# Patient Record
Sex: MALE | Race: OTHER | Employment: FULL TIME | URBAN - METROPOLITAN AREA
[De-identification: names, ages, dates, MRNs, and addresses within clinical notes are randomized per-mention and may not be internally consistent; named-entity substitution may affect disease eponyms.]

---

## 2017-01-10 ENCOUNTER — OFFICE VISIT (OUTPATIENT)
Dept: INTERNAL MEDICINE CLINIC | Facility: CLINIC | Age: 37
End: 2017-01-10

## 2017-01-10 VITALS
SYSTOLIC BLOOD PRESSURE: 134 MMHG | OXYGEN SATURATION: 96 % | HEIGHT: 73 IN | BODY MASS INDEX: 26.37 KG/M2 | TEMPERATURE: 99 F | WEIGHT: 199 LBS | DIASTOLIC BLOOD PRESSURE: 75 MMHG | HEART RATE: 108 BPM

## 2017-01-10 DIAGNOSIS — M54.42 ACUTE BILATERAL LOW BACK PAIN WITH LEFT-SIDED SCIATICA: Primary | ICD-10-CM

## 2017-01-10 PROCEDURE — 99213 OFFICE O/P EST LOW 20 MIN: CPT | Performed by: INTERNAL MEDICINE

## 2017-01-10 PROCEDURE — 99212 OFFICE O/P EST SF 10 MIN: CPT | Performed by: INTERNAL MEDICINE

## 2017-01-10 RX ORDER — CYCLOBENZAPRINE HCL 10 MG
10 TABLET ORAL 3 TIMES DAILY PRN
Qty: 60 TABLET | Refills: 0 | Status: SHIPPED | OUTPATIENT
Start: 2017-01-10 | End: 2017-01-30

## 2017-01-10 RX ORDER — HYDROCODONE BITARTRATE AND ACETAMINOPHEN 5; 325 MG/1; MG/1
1 TABLET ORAL EVERY 6 HOURS PRN
Qty: 40 TABLET | Refills: 0 | Status: SHIPPED | OUTPATIENT
Start: 2017-01-10 | End: 2017-02-24 | Stop reason: ALTCHOICE

## 2017-01-10 RX ORDER — METHYLPREDNISOLONE 4 MG/1
TABLET ORAL
Qty: 1 KIT | Refills: 0 | Status: SHIPPED | OUTPATIENT
Start: 2017-01-10 | End: 2017-02-24 | Stop reason: ALTCHOICE

## 2017-01-10 NOTE — PROGRESS NOTES
HPI:    Patient ID: Dheeraj Christensen is a 31year old male. HPI  Pt comes in with complaint of low back pain for few days, the pain kind of started right away and now its so bad having hard time moving and sitting straight.  Pain starts in te middle an exhibits tenderness. He exhibits no edema. Low back with shooting towards the left buttock and leg   Neurological: He is alert and oriented to person, place, and time. He has normal reflexes. Skin: Skin is warm and dry.               ASSESSMENT/PLAN:

## 2017-01-10 NOTE — PATIENT INSTRUCTIONS
ASSESSMENT/PLAN:   Acute bilateral low back pain with left-sided sciatica  (primary encounter diagnosis)- this most likely is a inflamed pretty, or bulging/herniated disc, treatment is the same.  Will give you steroids and muscle relaxant and pain medica

## 2017-01-17 ENCOUNTER — CHARTING TRANS (OUTPATIENT)
Dept: OTHER | Age: 37
End: 2017-01-17

## 2017-01-17 ENCOUNTER — TELEPHONE (OUTPATIENT)
Dept: INTERNAL MEDICINE CLINIC | Facility: CLINIC | Age: 37
End: 2017-01-17

## 2017-01-17 DIAGNOSIS — M54.42 ACUTE LEFT-SIDED LOW BACK PAIN WITH LEFT-SIDED SCIATICA: Primary | ICD-10-CM

## 2017-01-17 NOTE — TELEPHONE ENCOUNTER
Patient is looking for order for physical therapy  Please let him know when ready and mail to patient home

## 2017-02-01 ENCOUNTER — OFFICE VISIT (OUTPATIENT)
Dept: PHYSICAL THERAPY | Facility: HOSPITAL | Age: 37
End: 2017-02-01
Attending: INTERNAL MEDICINE
Payer: COMMERCIAL

## 2017-02-01 DIAGNOSIS — M54.42 ACUTE LEFT-SIDED LOW BACK PAIN WITH LEFT-SIDED SCIATICA: Primary | ICD-10-CM

## 2017-02-01 PROCEDURE — 97110 THERAPEUTIC EXERCISES: CPT

## 2017-02-01 PROCEDURE — 97161 PT EVAL LOW COMPLEX 20 MIN: CPT

## 2017-02-01 NOTE — PROGRESS NOTES
LUMBAR SPINE EVALUATION:   Referring Physician: Dr. Severiano Jenkins  Diagnosis: acute left sided LBP with left sciatica Date of Service: 2/1/2017   Date of Onset: 1/18/17    PATIENT SUMMARY   Mitra Farooq is a 31year old y/o male who presents to therapy to Flexion 5/5 5/5    Ankle DF (L4)  5/5 5/5                Hip Abduction 4+/5 4+/5    Hip Extension 4+/5 pain on right 4+/5      Flexibility:      R L   Hamstrings moderately restricted moderately restricted   Hip rotators Min restricted Min restricted   stefano care.    X___________________________________________________ Date____________________    Certification From: 8/1/7525  To:5/2/2017

## 2017-02-04 ENCOUNTER — OFFICE VISIT (OUTPATIENT)
Dept: PHYSICAL THERAPY | Facility: HOSPITAL | Age: 37
End: 2017-02-04
Attending: INTERNAL MEDICINE
Payer: COMMERCIAL

## 2017-02-04 PROCEDURE — 97110 THERAPEUTIC EXERCISES: CPT

## 2017-02-04 NOTE — PROGRESS NOTES
Diagnosis: acute left sided LBP with sciatica  Authorized # of Visits:  2         Next MD visit: none scheduled  Fall Risk: standard         Precautions: n/a           Medication Changes since last visit?: No  Subjective: Pt reports doing exercises 2-3/10

## 2017-02-08 ENCOUNTER — OFFICE VISIT (OUTPATIENT)
Dept: PHYSICAL THERAPY | Facility: HOSPITAL | Age: 37
End: 2017-02-08
Attending: INTERNAL MEDICINE
Payer: COMMERCIAL

## 2017-02-08 PROCEDURE — 97110 THERAPEUTIC EXERCISES: CPT

## 2017-02-08 NOTE — PROGRESS NOTES
Diagnosis: acute left sided LBP with sciatica  Authorized # of Visits:  3         Next MD visit: none scheduled  Fall Risk: standard         Precautions: n/a           Medication Changes since last visit?: No  Subjective: Pt reports doing exercises twice a

## 2017-02-11 ENCOUNTER — APPOINTMENT (OUTPATIENT)
Dept: PHYSICAL THERAPY | Facility: HOSPITAL | Age: 37
End: 2017-02-11
Attending: INTERNAL MEDICINE
Payer: COMMERCIAL

## 2017-02-15 ENCOUNTER — APPOINTMENT (OUTPATIENT)
Dept: PHYSICAL THERAPY | Facility: HOSPITAL | Age: 37
End: 2017-02-15
Attending: INTERNAL MEDICINE
Payer: COMMERCIAL

## 2017-02-18 ENCOUNTER — APPOINTMENT (OUTPATIENT)
Dept: PHYSICAL THERAPY | Facility: HOSPITAL | Age: 37
End: 2017-02-18
Attending: INTERNAL MEDICINE
Payer: COMMERCIAL

## 2017-02-22 ENCOUNTER — APPOINTMENT (OUTPATIENT)
Dept: PHYSICAL THERAPY | Facility: HOSPITAL | Age: 37
End: 2017-02-22
Attending: INTERNAL MEDICINE
Payer: COMMERCIAL

## 2017-02-24 ENCOUNTER — OFFICE VISIT (OUTPATIENT)
Dept: INTERNAL MEDICINE CLINIC | Facility: CLINIC | Age: 37
End: 2017-02-24

## 2017-02-24 VITALS
TEMPERATURE: 99 F | DIASTOLIC BLOOD PRESSURE: 83 MMHG | HEART RATE: 66 BPM | SYSTOLIC BLOOD PRESSURE: 127 MMHG | WEIGHT: 197.63 LBS | BODY MASS INDEX: 26 KG/M2

## 2017-02-24 DIAGNOSIS — M70.62 TROCHANTERIC BURSITIS OF LEFT HIP: Primary | ICD-10-CM

## 2017-02-24 PROCEDURE — 99212 OFFICE O/P EST SF 10 MIN: CPT | Performed by: INTERNAL MEDICINE

## 2017-02-24 PROCEDURE — 99214 OFFICE O/P EST MOD 30 MIN: CPT | Performed by: INTERNAL MEDICINE

## 2017-02-24 NOTE — PROGRESS NOTES
HPI:    Patient ID: Love Dotson is a 31year old male.     HPI     Hip pain   In early January 2017, patient went to Dr. Jj Hernandez for low back pain and was diagnosed with muscle spasms , possible disc problem - was prescribed a steroid medication Head: Normocephalic and atraumatic. Eyes: Conjunctivae are normal.   Neck: Normal range of motion. Pulmonary/Chest: Effort normal. No respiratory distress. Musculoskeletal: Normal range of motion.    Left hip no pain on extension/flexion or rotation documentation has been prepared under the direction and in the presence of Lord Bridgett MD.   Electronically Signed: Gabino Cason, 2/24/2017, 12:30 PM.    I, Lord Bridgett MD,  personally performed the services described in this documentation.  All medic

## 2017-03-01 ENCOUNTER — APPOINTMENT (OUTPATIENT)
Dept: PHYSICAL THERAPY | Facility: HOSPITAL | Age: 37
End: 2017-03-01
Attending: INTERNAL MEDICINE
Payer: COMMERCIAL

## 2017-03-23 ENCOUNTER — LAB ENCOUNTER (OUTPATIENT)
Dept: LAB | Age: 37
End: 2017-03-23
Attending: INTERNAL MEDICINE
Payer: COMMERCIAL

## 2017-03-23 ENCOUNTER — OFFICE VISIT (OUTPATIENT)
Dept: INTERNAL MEDICINE CLINIC | Facility: CLINIC | Age: 37
End: 2017-03-23

## 2017-03-23 VITALS
RESPIRATION RATE: 18 BRPM | WEIGHT: 192 LBS | TEMPERATURE: 99 F | BODY MASS INDEX: 25.45 KG/M2 | SYSTOLIC BLOOD PRESSURE: 130 MMHG | HEIGHT: 73 IN | DIASTOLIC BLOOD PRESSURE: 78 MMHG | HEART RATE: 62 BPM

## 2017-03-23 DIAGNOSIS — Z00.00 ROUTINE PHYSICAL EXAMINATION: Primary | ICD-10-CM

## 2017-03-23 DIAGNOSIS — Z00.00 ROUTINE PHYSICAL EXAMINATION: ICD-10-CM

## 2017-03-23 DIAGNOSIS — M25.552 LEFT HIP PAIN: ICD-10-CM

## 2017-03-23 LAB
ALBUMIN SERPL BCP-MCNC: 4.5 G/DL (ref 3.5–4.8)
ALBUMIN/GLOB SERPL: 1.6 {RATIO} (ref 1–2)
ALP SERPL-CCNC: 71 U/L (ref 32–100)
ALT SERPL-CCNC: 20 U/L (ref 17–63)
ANION GAP SERPL CALC-SCNC: 6 MMOL/L (ref 0–18)
AST SERPL-CCNC: 21 U/L (ref 15–41)
BASOPHILS # BLD: 0 K/UL (ref 0–0.2)
BASOPHILS NFR BLD: 1 %
BILIRUB SERPL-MCNC: 0.8 MG/DL (ref 0.3–1.2)
BUN SERPL-MCNC: 14 MG/DL (ref 8–20)
BUN/CREAT SERPL: 16.7 (ref 10–20)
CALCIUM SERPL-MCNC: 9.7 MG/DL (ref 8.5–10.5)
CHLORIDE SERPL-SCNC: 105 MMOL/L (ref 95–110)
CHOLEST SERPL-MCNC: 200 MG/DL (ref 110–200)
CO2 SERPL-SCNC: 29 MMOL/L (ref 22–32)
CREAT SERPL-MCNC: 0.84 MG/DL (ref 0.5–1.5)
EOSINOPHIL # BLD: 0.1 K/UL (ref 0–0.7)
EOSINOPHIL NFR BLD: 2 %
ERYTHROCYTE [DISTWIDTH] IN BLOOD BY AUTOMATED COUNT: 12.8 % (ref 11–15)
GLOBULIN PLAS-MCNC: 2.8 G/DL (ref 2.5–3.7)
GLUCOSE SERPL-MCNC: 89 MG/DL (ref 70–99)
HCT VFR BLD AUTO: 42.8 % (ref 41–52)
HDLC SERPL-MCNC: 44 MG/DL
HGB BLD-MCNC: 14.4 G/DL (ref 13.5–17.5)
LDLC SERPL CALC-MCNC: 142 MG/DL (ref 0–99)
LYMPHOCYTES # BLD: 1.7 K/UL (ref 1–4)
LYMPHOCYTES NFR BLD: 31 %
MCH RBC QN AUTO: 28.1 PG (ref 27–32)
MCHC RBC AUTO-ENTMCNC: 33.5 G/DL (ref 32–37)
MCV RBC AUTO: 83.8 FL (ref 80–100)
MONOCYTES # BLD: 0.3 K/UL (ref 0–1)
MONOCYTES NFR BLD: 5 %
NEUTROPHILS # BLD AUTO: 3.3 K/UL (ref 1.8–7.7)
NEUTROPHILS NFR BLD: 61 %
NONHDLC SERPL-MCNC: 156 MG/DL
OSMOLALITY UR CALC.SUM OF ELEC: 290 MOSM/KG (ref 275–295)
PLATELET # BLD AUTO: 169 K/UL (ref 140–400)
PMV BLD AUTO: 9.2 FL (ref 7.4–10.3)
POTASSIUM SERPL-SCNC: 4.5 MMOL/L (ref 3.3–5.1)
PROT SERPL-MCNC: 7.3 G/DL (ref 5.9–8.4)
RBC # BLD AUTO: 5.11 M/UL (ref 4.5–5.9)
SODIUM SERPL-SCNC: 140 MMOL/L (ref 136–144)
TRIGL SERPL-MCNC: 70 MG/DL (ref 1–149)
TSH SERPL-ACNC: 2.82 UIU/ML (ref 0.34–5.6)
VIT B12 SERPL-MCNC: 434 PG/ML (ref 181–914)
WBC # BLD AUTO: 5.5 K/UL (ref 4–11)

## 2017-03-23 PROCEDURE — 36415 COLL VENOUS BLD VENIPUNCTURE: CPT

## 2017-03-23 PROCEDURE — 80061 LIPID PANEL: CPT

## 2017-03-23 PROCEDURE — 85025 COMPLETE CBC W/AUTO DIFF WBC: CPT

## 2017-03-23 PROCEDURE — 84443 ASSAY THYROID STIM HORMONE: CPT

## 2017-03-23 PROCEDURE — 80053 COMPREHEN METABOLIC PANEL: CPT

## 2017-03-23 PROCEDURE — 82607 VITAMIN B-12: CPT

## 2017-03-23 PROCEDURE — 99395 PREV VISIT EST AGE 18-39: CPT | Performed by: INTERNAL MEDICINE

## 2017-03-23 NOTE — PROGRESS NOTES
HPI:    Patient ID: Shweta Montesinos is a 31year old male. HPI  Patient is here requesting a physical exam.  Saw him here last a couple of years ago. Some issues earlier in the year. In January he developed severe lower back spasm.   Saw different i Genitourinary: Negative for dysuria, hematuria and sexual dysfunction. Musculoskeletal: Positive for joint pain. Skin: Negative for rash. Neurological: Negative for weakness, numbness and headaches. Hematological: Does not bruise/bleed easily. Health maintenance issues reviewed. Recent musculoskeletal problems. Discussed the importance of getting back to her regular exercise routine, drinking plenty of fluids, healthy diet. We will check fasting blood work and contact patient with results.

## 2017-04-05 ENCOUNTER — PATIENT MESSAGE (OUTPATIENT)
Dept: INTERNAL MEDICINE CLINIC | Facility: CLINIC | Age: 37
End: 2017-04-05

## 2017-04-05 ENCOUNTER — OFFICE VISIT (OUTPATIENT)
Dept: INTERNAL MEDICINE CLINIC | Facility: CLINIC | Age: 37
End: 2017-04-05

## 2017-04-05 VITALS
OXYGEN SATURATION: 98 % | SYSTOLIC BLOOD PRESSURE: 114 MMHG | WEIGHT: 185.81 LBS | DIASTOLIC BLOOD PRESSURE: 56 MMHG | HEIGHT: 74 IN | HEART RATE: 76 BPM | BODY MASS INDEX: 23.85 KG/M2 | TEMPERATURE: 98 F

## 2017-04-05 DIAGNOSIS — IMO0001 COLD: Primary | ICD-10-CM

## 2017-04-05 PROCEDURE — 99213 OFFICE O/P EST LOW 20 MIN: CPT | Performed by: INTERNAL MEDICINE

## 2017-04-05 PROCEDURE — 99212 OFFICE O/P EST SF 10 MIN: CPT | Performed by: INTERNAL MEDICINE

## 2017-04-05 RX ORDER — AZITHROMYCIN 250 MG/1
TABLET, FILM COATED ORAL
Qty: 6 TABLET | Refills: 0 | Status: SHIPPED | OUTPATIENT
Start: 2017-04-05 | End: 2018-05-23 | Stop reason: ALTCHOICE

## 2017-04-05 NOTE — PROGRESS NOTES
HPI:    Patient ID: Wendi Lloyd is a 31year old male. URI   This is a new problem. The current episode started in the past 7 days. The problem has been unchanged. The maximum temperature recorded prior to his arrival was 101 - 101.9 F.  The fever clear to a/p   Lymphadenopathy:     He has no cervical adenopathy. Neurological: He is oriented to person, place, and time. Skin: Skin is warm. No rash noted. He is not diaphoretic. Nursing note and vitals reviewed.              ASSESSMENT/PLAN:   Col

## 2017-04-05 NOTE — TELEPHONE ENCOUNTER
From: Gallo Deal  To: Lillian Ruiz MD  Sent: 2017 12:00 PM CDT  Subject: Prescription Question    Hi Dr. Nohemy Rivero asked during my appt if I needed a cough medicine prescription.  I thought I had one at home but it has  as of

## 2017-04-06 RX ORDER — PROMETHAZINE HYDROCHLORIDE AND CODEINE PHOSPHATE 6.25; 1 MG/5ML; MG/5ML
2.5 SYRUP ORAL EVERY 4 HOURS PRN
Qty: 140 ML | Refills: 0 | Status: SHIPPED | OUTPATIENT
Start: 2017-04-06 | End: 2018-05-23 | Stop reason: ALTCHOICE

## 2018-05-23 ENCOUNTER — OFFICE VISIT (OUTPATIENT)
Dept: INTERNAL MEDICINE CLINIC | Facility: CLINIC | Age: 38
End: 2018-05-23

## 2018-05-23 VITALS
DIASTOLIC BLOOD PRESSURE: 78 MMHG | HEIGHT: 74 IN | TEMPERATURE: 98 F | SYSTOLIC BLOOD PRESSURE: 121 MMHG | BODY MASS INDEX: 24.68 KG/M2 | HEART RATE: 55 BPM | WEIGHT: 192.31 LBS

## 2018-05-23 DIAGNOSIS — B35.0 TINEA BARBAE: Primary | ICD-10-CM

## 2018-05-23 PROCEDURE — 99213 OFFICE O/P EST LOW 20 MIN: CPT | Performed by: INTERNAL MEDICINE

## 2018-05-23 PROCEDURE — 99212 OFFICE O/P EST SF 10 MIN: CPT | Performed by: INTERNAL MEDICINE

## 2018-05-23 NOTE — PROGRESS NOTES
Rachel Jordan is a 30year old male. Patient presents with:  Rash: neck area      HPI:     HPI  She is a 53-year-old male here for a rash that take 4 weeks ago in the neck area. He has long beard.   He reports itching and slightly scaly red rash on b Diagnoses and all orders for this visit:    Tinea barbae  Given miconazole external lotion. Advised the patient to keep the area clean. That completely dry after bathing or washing the face.   Advised to avoid scratching of the area, due to the risk of se

## 2019-05-28 ENCOUNTER — OFFICE VISIT (OUTPATIENT)
Dept: INTERNAL MEDICINE CLINIC | Facility: CLINIC | Age: 39
End: 2019-05-28
Payer: COMMERCIAL

## 2019-05-28 VITALS
DIASTOLIC BLOOD PRESSURE: 84 MMHG | BODY MASS INDEX: 24.3 KG/M2 | SYSTOLIC BLOOD PRESSURE: 122 MMHG | HEART RATE: 66 BPM | TEMPERATURE: 99 F | WEIGHT: 189.31 LBS | HEIGHT: 74 IN

## 2019-05-28 DIAGNOSIS — J02.9 PHARYNGITIS, UNSPECIFIED ETIOLOGY: Primary | ICD-10-CM

## 2019-05-28 PROCEDURE — 87880 STREP A ASSAY W/OPTIC: CPT | Performed by: INTERNAL MEDICINE

## 2019-05-28 PROCEDURE — 99212 OFFICE O/P EST SF 10 MIN: CPT | Performed by: INTERNAL MEDICINE

## 2019-05-28 PROCEDURE — 99213 OFFICE O/P EST LOW 20 MIN: CPT | Performed by: INTERNAL MEDICINE

## 2019-05-28 NOTE — PROGRESS NOTES
Jett Castro is a 41year old male. Patient presents with:  Sore Throat: patient c/o fever, cough, and chills      HPI:     HPI  Patient is here with complaints of sore throat, fever, body aches and chills that started yesterday.   Has a mild dry cou 98.5 °F (36.9 °C) (Oral)   Ht 6' 2\" (1.88 m)   Wt 189 lb 4.8 oz (85.9 kg)   BMI 24.30 kg/m²   Physical Exam   Constitutional: He is oriented to person, place, and time. He appears well-developed and well-nourished.    HENT:   Head: Normocephalic and atraum daily if needed for severe nasal congestion and post nasal drip.   Tylenol or Ibuprofen as needed for any pain or fever  May use over the counter antitussives like Robitussin or Delsym as needed for cough  Contact the office if symptoms worsen or do not imp

## 2020-03-30 ENCOUNTER — TELEPHONE (OUTPATIENT)
Dept: SURGERY | Facility: CLINIC | Age: 40
End: 2020-03-30

## 2020-03-30 NOTE — TELEPHONE ENCOUNTER
Pt is new. Pt is experiencing pain and discomfort in right testicle. Travel screen completed.  Please advise

## 2020-03-31 NOTE — TELEPHONE ENCOUNTER
Spoke to patient. Complained of right testicular pain and says, right is bigger than the left. He said that his pain level is at 3-4. \"Nothing is critical,\" he said. Denies having injury or trauma. Pain started a week ago (mid March).      At this time, marita

## 2020-05-06 ENCOUNTER — TELEPHONE (OUTPATIENT)
Dept: SURGERY | Facility: CLINIC | Age: 40
End: 2020-05-06

## 2020-12-08 NOTE — PROGRESS NOTES
HPI:    Patient ID: Buffy Hernández is a 47year old male. HPI  Patient is seen in the office today requesting a general physical exam.  Overall he is in good health. No known chronic conditions or prescription medications.   I last saw him in March Skin: Negative for rash. Neurological: Negative for weakness, numbness and headaches. Hematological: Does not bruise/bleed easily. Psychiatric/Behavioral: Negative for depressed mood. The patient is nervous/anxious.              Current Outpatient M 4.8 oz (85.9 kg)  05/23/18 : 192 lb 4.8 oz (87.2 kg)  04/05/17 : 185 lb 12.8 oz (84.3 kg)  03/23/17 : 192 lb (87.1 kg)  02/24/17 : 197 lb 9.6 oz (89.6 kg)            ASSESSMENT/PLAN:   1.  Routine physical examination   p CBC WITH DIFFERENTIAL WITH    Patie

## 2020-12-09 ENCOUNTER — OFFICE VISIT (OUTPATIENT)
Dept: INTERNAL MEDICINE CLINIC | Facility: CLINIC | Age: 40
End: 2020-12-09
Payer: COMMERCIAL

## 2020-12-09 ENCOUNTER — LAB ENCOUNTER (OUTPATIENT)
Dept: LAB | Facility: HOSPITAL | Age: 40
End: 2020-12-09
Attending: INTERNAL MEDICINE
Payer: COMMERCIAL

## 2020-12-09 VITALS
WEIGHT: 188 LBS | RESPIRATION RATE: 18 BRPM | DIASTOLIC BLOOD PRESSURE: 74 MMHG | BODY MASS INDEX: 24.13 KG/M2 | SYSTOLIC BLOOD PRESSURE: 112 MMHG | HEART RATE: 54 BPM | HEIGHT: 74 IN

## 2020-12-09 DIAGNOSIS — Z00.00 ROUTINE PHYSICAL EXAMINATION: Primary | ICD-10-CM

## 2020-12-09 DIAGNOSIS — Z23 NEED FOR VACCINATION: ICD-10-CM

## 2020-12-09 DIAGNOSIS — Z00.00 ROUTINE PHYSICAL EXAMINATION: ICD-10-CM

## 2020-12-09 PROCEDURE — 3008F BODY MASS INDEX DOCD: CPT | Performed by: INTERNAL MEDICINE

## 2020-12-09 PROCEDURE — 99395 PREV VISIT EST AGE 18-39: CPT | Performed by: INTERNAL MEDICINE

## 2020-12-09 PROCEDURE — 80053 COMPREHEN METABOLIC PANEL: CPT

## 2020-12-09 PROCEDURE — 3078F DIAST BP <80 MM HG: CPT | Performed by: INTERNAL MEDICINE

## 2020-12-09 PROCEDURE — 85025 COMPLETE CBC W/AUTO DIFF WBC: CPT

## 2020-12-09 PROCEDURE — 36415 COLL VENOUS BLD VENIPUNCTURE: CPT

## 2020-12-09 PROCEDURE — 3074F SYST BP LT 130 MM HG: CPT | Performed by: INTERNAL MEDICINE

## 2020-12-09 PROCEDURE — 80061 LIPID PANEL: CPT

## 2020-12-09 PROCEDURE — 84443 ASSAY THYROID STIM HORMONE: CPT

## 2020-12-09 PROCEDURE — 90471 IMMUNIZATION ADMIN: CPT | Performed by: INTERNAL MEDICINE

## 2020-12-09 PROCEDURE — 90686 IIV4 VACC NO PRSV 0.5 ML IM: CPT | Performed by: INTERNAL MEDICINE

## 2020-12-09 RX ORDER — CICLOPIROX 1 G/100ML
SHAMPOO TOPICAL
COMMUNITY
Start: 2020-10-12 | End: 2021-04-21

## 2021-03-03 ENCOUNTER — NURSE TRIAGE (OUTPATIENT)
Dept: INTERNAL MEDICINE CLINIC | Facility: CLINIC | Age: 41
End: 2021-03-03

## 2021-03-03 ENCOUNTER — OFFICE VISIT (OUTPATIENT)
Dept: INTERNAL MEDICINE CLINIC | Facility: CLINIC | Age: 41
End: 2021-03-03
Payer: COMMERCIAL

## 2021-03-03 VITALS
DIASTOLIC BLOOD PRESSURE: 82 MMHG | BODY MASS INDEX: 21.56 KG/M2 | WEIGHT: 168 LBS | HEIGHT: 74 IN | HEART RATE: 82 BPM | RESPIRATION RATE: 18 BRPM | SYSTOLIC BLOOD PRESSURE: 124 MMHG

## 2021-03-03 DIAGNOSIS — K59.00 CONSTIPATION, UNSPECIFIED CONSTIPATION TYPE: Primary | ICD-10-CM

## 2021-03-03 PROCEDURE — 3008F BODY MASS INDEX DOCD: CPT | Performed by: INTERNAL MEDICINE

## 2021-03-03 PROCEDURE — 3074F SYST BP LT 130 MM HG: CPT | Performed by: INTERNAL MEDICINE

## 2021-03-03 PROCEDURE — 3079F DIAST BP 80-89 MM HG: CPT | Performed by: INTERNAL MEDICINE

## 2021-03-03 PROCEDURE — 99213 OFFICE O/P EST LOW 20 MIN: CPT | Performed by: INTERNAL MEDICINE

## 2021-03-03 NOTE — PATIENT INSTRUCTIONS
Increase fluid and fiber intake on a daily basis. For breakfast, try bran flakes with or without raisins. Also consider taking a fiber supplement such as Metamucil or Benefiber on a daily basis. This would be a long-term plan.   For the short-term, we ne

## 2021-03-03 NOTE — PROGRESS NOTES
HPI:    Patient ID: Kris Corona is a 47year old male. HPI  Patient is here with complaints about constipation. Its been a chronic issue but it has gotten particularly worse in the last 3 weeks. Babies had 1 normal bowel movement.   The others h 168 lb (76.2 kg)  12/09/20 : 188 lb (85.3 kg)  05/28/19 : 189 lb 4.8 oz (85.9 kg)  05/23/18 : 192 lb 4.8 oz (87.2 kg)  04/05/17 : 185 lb 12.8 oz (84.3 kg)  03/23/17 : 192 lb (87.1 kg)            ASSESSMENT/PLAN:   1.  Constipation, unspecified constipation

## 2021-03-03 NOTE — TELEPHONE ENCOUNTER
Patient calling reports constipation for  7 days, expelling flatus , does have some abdominal discomfort   Has not tried anything OTC for this     Explained to patient he needs to be seen and, offered 2 appts for today and then stated   \"  I have to to ca

## 2021-04-16 NOTE — TELEPHONE ENCOUNTER
Wife, Rosi Boyd on ELFE[employer], states patient has depression and is seeing psychologist Dr. Macky Apley. Dr. Venkat Moore informed patient that low Vitamin D levels can trigger depression.  Patient and spouse were advised to call PCP and ask if patient can have his Vitamin

## 2021-04-22 ENCOUNTER — TELEPHONE (OUTPATIENT)
Dept: INTERNAL MEDICINE CLINIC | Facility: CLINIC | Age: 41
End: 2021-04-22

## 2021-04-22 ENCOUNTER — LAB ENCOUNTER (OUTPATIENT)
Dept: LAB | Facility: HOSPITAL | Age: 41
End: 2021-04-22
Attending: INTERNAL MEDICINE
Payer: COMMERCIAL

## 2021-04-22 DIAGNOSIS — F32.9 MAJOR DEPRESSIVE DISORDER, REMISSION STATUS UNSPECIFIED, UNSPECIFIED WHETHER RECURRENT: ICD-10-CM

## 2021-04-22 PROBLEM — F32.1 CURRENT MODERATE EPISODE OF MAJOR DEPRESSIVE DISORDER (HCC): Status: ACTIVE | Noted: 2021-04-22

## 2021-04-22 PROCEDURE — 82306 VITAMIN D 25 HYDROXY: CPT

## 2021-04-22 PROCEDURE — 36415 COLL VENOUS BLD VENIPUNCTURE: CPT

## 2021-04-22 NOTE — TELEPHONE ENCOUNTER
Please have patient make an appointment with nurse practitioner Willow Severs today or next week    Willow Severs, ANP  Working with [facility]

## 2021-04-22 NOTE — TELEPHONE ENCOUNTER
Pt's wife states that the pt is seeing psychiatrist Dr. Esme Steele, who in turn, referred the pt to [facility] for a day program for treatment of depression. He began the program yesterday.   The pt's wife is asking if Dr. Ari Bryan would write a letter for h

## 2021-04-22 NOTE — TELEPHONE ENCOUNTER
Left message to pt to call back. To reception staff, pls call pt for appt.    Also mychart message sent to pt             Future Appointments   Date Time Provider Jermaine Castellano   4/26/2021  8:15 AM ADULT PSYCH [facility] - [place] [facility] OUTPT [facility]   4

## 2021-04-22 NOTE — ASSESSMENT & PLAN NOTE
A/P 29-year-old pleasant gentleman appears withdrawn due to depression. Patient states he became depressed working from home. He was working 10 hours a day. He enrolled in a [facility] program for depression.   It will be a day program for 1 mon

## 2021-04-22 NOTE — PROGRESS NOTES
HPI:    Patient ID: Brock Fu is a 47year old male.  with two children. Children are age 6 and 3. HPI Depression  Partial program for depression. Working from home and pandemic related depression. He works for [employer].  He d Review of Systems   Constitutional: Negative for chills, fatigue and fever. HENT: Negative for ear pain, hearing loss, sinus pain, sore throat and trouble swallowing. Eyes: Negative for pain and visual disturbance.    Respiratory: Negative for co : 166 lb 3.2 oz (75.4 kg)  03/03/21 : 168 lb (76.2 kg)    Body mass index is 21.34 kg/m². (2)           ASSESSMENT/PLAN:     Problem List Items Addressed This Visit        Unprioritized    Current moderate episode of major depressive disorder ([facility] [address] - Primar

## 2021-04-26 PROBLEM — F32.2 MDD (MAJOR DEPRESSIVE DISORDER), SINGLE EPISODE, SEVERE , NO PSYCHOSIS (HCC): Status: ACTIVE | Noted: 2021-04-26

## 2021-04-26 NOTE — BH LEVEL OF CARE ASSESSMENT
Crisis Evaluation Assessment    Denzel White YOB: 1980   Age 47year old MRN [id number]   Location [address] Attending Rian Tolbert MD      Patient's legal sex: male  Patient identifies as: male  [de-identified] Anders Shah does reports ome racing thoughts. Ave reports feeling depressed and struggling with suicidal thoughts since January of this year. It was recently discovered that he has been non compliant with medication.   Earlier this month, Anders Shah reported flee [place]. He admits that he went there withthe intent of jumping. After the police were able to rackhis phone, Denzel's brother in law eliu to the [employer]. The brotheri nlaw found car and saw he was not in it.   He located Denzel on the fifith floor of the 1st.  It was recently discovered Iraj has been noncompliant with hi smedication. He was started on Lexapro 5 m.g.in AOril. It was increased to 10 m.g because he was not responding and he had not admitted he was not taking it.   His psychiatrist prescribe to look down at his hands, minimal eye contact)  Psychomotor Behavior  Gait/Movement: Other (comment) (sitting on ER cart)  Abnormal movements: Hand wringing  Posture:  Other (comment) (sitting on ER cart)  Rate of Movement: Normal  Mood and Affect  Mood or parking garage in [place]. He admits that he intended to jump. Homicidal thoughts are denied. In the ER, Shweta Harrington presents as markedly depressed, withdrawn and high risk for suicidal behavior.          Risk/Protective Factors  Protective Factors: wife, 2 ch

## 2021-04-26 NOTE — ED PROVIDER NOTES
Patient Seen in: [facility] AND [facility] Emergency Department      History   Patient presents with:  Eval-P    Stated Complaint: SI    HPI/Subjective:   HPI    30-year-old male with history of depression and anxiety presents with suicidal ideation.   The sadie retractions, lungs are clear to auscultation  Cardiovascular: regular rate and rhythm  Gastrointestinal:  abdomen is soft and non tender, no masses, bowel sounds normal  Neurological: Speech normal.  Moving extremities equally x4.   Skin: warm and dry, no r transfer  4/26/2021  1:02 pm    Follow-up:  No follow-up provider specified.         Medications Prescribed:  Current Discharge Medication List

## 2021-04-26 NOTE — ED QUICK NOTES
Patient is fasting until sundown as a [employer] practice. Vegetarian dinner will be ordered at 1800 for patient.

## 2021-04-26 NOTE — ED NOTES
Call from [facility] with Abdullahi Cain. She is enroute to the ED. Called mother and let her know the [facility] worker is enroute to the Hospital.   Mother states she is on her way back to the ER.

## 2021-04-26 NOTE — ED NOTES
Updated Vicky Motley and his wife regarding the transfer. Re-explained that there Is no in person visitation and that his wife will not be able to go on the unit. Also reviewed the need for a [employer] and that Denzel would need to provide her with a passoword.  His wif

## 2021-04-26 NOTE — ED INITIAL ASSESSMENT (HPI)
Brought by [facility] after threatening to jump off the 5th floor of a parking garage. Per EMS, police were called to a hotel parking lot where the patient was threatening to jump in front of his brother-in-law, Lena Bustamante.  Calm and cooperative with EMS and on

## 2021-04-26 NOTE — ED NOTES
Patient's wife advised writer that Denzel is vegetarian and that he fasts from sunrise/divya to sundown. His wife  also inquired about allowing for his prayer time, 5 times a day and effect on group participation.  Encouraged Denzel to discuss with the treatm

## 2021-04-26 NOTE — PROGRESS NOTES
04/26/21 1336   COVID Exposure Risk Screening   Have you been practicing social distancing? Yes   Have you been wearing a mask when in the community? Yes   Are the people you live with following social distancing and wearing a mask?  Yes  (Lives with wif

## 2021-04-26 NOTE — ED QUICK NOTES
Cell phone and wallet given to wife to take home, patient's clothes remain in secure lockup.  Wife left but stated she will return

## 2021-04-27 NOTE — ED QUICK NOTES
[facility]ported to [facility]. Vital signs stable at time of departure. Patient belongings included in transport.

## 2021-04-30 NOTE — CM/SW NOTE
Arpita Vanegas from [facility] reporting calling requesting to know patient's disposition from [address] [place] [address], as she received FOID report yesterday from [facility]. This RN notified Arpita Vanegas from [facility] patient went to [facility] 4/26 @ 2007.

## 2021-08-04 ENCOUNTER — LAB ENCOUNTER (OUTPATIENT)
Dept: LAB | Facility: HOSPITAL | Age: 41
End: 2021-08-04
Attending: Other
Payer: COMMERCIAL

## 2021-08-04 DIAGNOSIS — Z51.81 ENCOUNTER FOR THERAPEUTIC DRUG MONITORING: Primary | ICD-10-CM

## 2021-08-04 LAB — LITHIUM SERPL-SCNC: 0.5 MMOL/L (ref 0.6–1.2)

## 2021-08-04 PROCEDURE — 80178 ASSAY OF LITHIUM: CPT

## 2021-08-04 PROCEDURE — 36415 COLL VENOUS BLD VENIPUNCTURE: CPT

## 2021-12-31 ENCOUNTER — TELEMEDICINE (OUTPATIENT)
Dept: INTERNAL MEDICINE CLINIC | Facility: CLINIC | Age: 41
End: 2021-12-31

## 2021-12-31 DIAGNOSIS — J01.10 ACUTE FRONTAL SINUSITIS, RECURRENCE NOT SPECIFIED: Primary | ICD-10-CM

## 2021-12-31 PROCEDURE — 99213 OFFICE O/P EST LOW 20 MIN: CPT | Performed by: INTERNAL MEDICINE

## 2021-12-31 RX ORDER — FLUTICASONE PROPIONATE 50 MCG
2 SPRAY, SUSPENSION (ML) NASAL DAILY
Qty: 1 EACH | Refills: 0 | Status: SHIPPED | OUTPATIENT
Start: 2021-12-31 | End: 2021-12-31

## 2021-12-31 RX ORDER — MONTELUKAST SODIUM 10 MG/1
TABLET ORAL
Qty: 90 TABLET | Refills: 0 | Status: SHIPPED | OUTPATIENT
Start: 2021-12-31

## 2021-12-31 RX ORDER — BENZONATATE 100 MG/1
100 CAPSULE ORAL 3 TIMES DAILY PRN
Qty: 30 CAPSULE | Refills: 0 | Status: SHIPPED | OUTPATIENT
Start: 2021-12-31

## 2021-12-31 RX ORDER — FLUTICASONE PROPIONATE 50 MCG
SPRAY, SUSPENSION (ML) NASAL
Qty: 48 G | Refills: 0 | Status: SHIPPED | OUTPATIENT
Start: 2021-12-31

## 2021-12-31 RX ORDER — AMOXICILLIN AND CLAVULANATE POTASSIUM 875; 125 MG/1; MG/1
1 TABLET, FILM COATED ORAL 2 TIMES DAILY
Qty: 20 TABLET | Refills: 0 | Status: SHIPPED | OUTPATIENT
Start: 2021-12-31 | End: 2022-01-10

## 2021-12-31 RX ORDER — MONTELUKAST SODIUM 10 MG/1
10 TABLET ORAL DAILY
Qty: 30 TABLET | Refills: 0 | Status: SHIPPED | OUTPATIENT
Start: 2021-12-31 | End: 2021-12-31

## 2021-12-31 NOTE — PROGRESS NOTES
This is a telemedicine visit with live, interactive video and audio. Patient understands and accepts financial responsibility for any deductible, co-insurance and/or co-pays associated with this service.     SUBJECTIVE  Is scheduled video visit today co mouth daily. Ros: Sinus pressure, congestion,cough  OBJECTIVE  Physical Exam:    he Is awake alert oriented    ASSESSMENT & PLAN  There are no diagnoses linked to this encounter.   Sinusitis/COVID negative we will start treating with antibiotic,  laura

## 2023-01-07 NOTE — TELEPHONE ENCOUNTER
Bed: 07  Expected date:   Expected time:   Means of arrival:   Comments:  Triage   appt was made with Ena Couch for today at 3:00 pm.

## 2023-01-10 NOTE — Clinical Note
4 weeks in office Pt here for day 2 of Gazyva infusion. Port accessed with blood return noted. Pt has no complaints. Pt states he had no issues at home after tx on 1/9/22, pt states he went home and slept. Tx released. Patient's status assessed and documented appropriately. All labs and required results were also reviewed today. Treatment parameters have been reviewed. Today's treatment has been approved by the provider. Treatment orders and medication sequencing (when applicable) was verified by 2 registered nurses. The treatment plan was confirmed with the patient prior to administration, and the patient understands the need to report any treatment-related symptoms. Prior to administration, when applicable, the following 8 elements of medication administration were reviewed with 2nd Registered Nurse prior to dosing: drug name, drug dose, infusion volume when prepared in a syringe, rate of administration, expiration dates and/or times, appearance and integrity of drug(s), and rate of pump for infusion. The 5 rights of medication administration have been verified. Pt tolerated tx with no complaints. Left by wheelchair with son.  Discharge instructions given

## 2023-04-23 ENCOUNTER — HOSPITAL ENCOUNTER (EMERGENCY)
Facility: HOSPITAL | Age: 43
Discharge: HOME | End: 2023-04-23
Attending: EMERGENCY MEDICINE
Payer: COMMERCIAL

## 2023-04-23 VITALS
WEIGHT: 185 LBS | RESPIRATION RATE: 18 BRPM | OXYGEN SATURATION: 98 % | TEMPERATURE: 98 F | DIASTOLIC BLOOD PRESSURE: 85 MMHG | HEART RATE: 51 BPM | SYSTOLIC BLOOD PRESSURE: 124 MMHG | HEIGHT: 73 IN | BODY MASS INDEX: 24.52 KG/M2

## 2023-04-23 DIAGNOSIS — S39.012A BACK STRAIN, INITIAL ENCOUNTER: Primary | ICD-10-CM

## 2023-04-23 PROCEDURE — 96372 THER/PROPH/DIAG INJ SC/IM: CPT

## 2023-04-23 PROCEDURE — 99284 EMERGENCY DEPT VISIT MOD MDM: CPT

## 2023-04-23 PROCEDURE — 99283 EMERGENCY DEPT VISIT LOW MDM: CPT

## 2023-04-23 RX ORDER — MORPHINE SULFATE 4 MG/ML
4 INJECTION, SOLUTION INTRAMUSCULAR; INTRAVENOUS ONCE
Status: COMPLETED | OUTPATIENT
Start: 2023-04-23 | End: 2023-04-23

## 2023-04-23 RX ORDER — NAPROXEN 500 MG/1
500 TABLET ORAL ONCE
Status: COMPLETED | OUTPATIENT
Start: 2023-04-23 | End: 2023-04-23

## 2023-04-23 RX ORDER — NAPROXEN 500 MG/1
500 TABLET ORAL 2 TIMES DAILY PRN
Qty: 20 TABLET | Refills: 0 | Status: SHIPPED | OUTPATIENT
Start: 2023-04-23 | End: 2023-04-30

## 2023-04-23 RX ORDER — HYDROCODONE BITARTRATE AND ACETAMINOPHEN 5; 325 MG/1; MG/1
1 TABLET ORAL EVERY 6 HOURS PRN
Qty: 10 TABLET | Refills: 0 | Status: SHIPPED | OUTPATIENT
Start: 2023-04-23 | End: 2023-04-30

## 2023-04-23 NOTE — ED INITIAL ASSESSMENT (HPI)
Patient brought in by EMS for back pain. Patient hurt his back playing basketball this morning. Denies any other complaints. Hx of slipped disk six years ago. Pain worsens with ambulation. Denies loss of bladder of bowel control.

## 2023-04-24 NOTE — DISCHARGE INSTRUCTIONS
Take naproxen every 12 hours for the next 3 to 5 days then as needed thereafter for pain. If you continue to have pain take norco as prescribed for severe pain. Do not drink alcohol, drive, or take acetaminophen (tylenol) while taking norco.  Use an over the counter stool softener such as colace while taking norco.  Risk of addiction to pain medication increases after 3 days, make sure to use this for shortest duration as possible and only for severe pain. Use ice for the first 24 to 48 hours and then use heat as needed for pain. You can try heat sooner than 48 hours if this is helping. See primary care in 1 week for follow-up appointment. Return to the ER if you develop worsening symptoms, incontinence, weakness, or any emergent concerns.

## 2023-04-25 ENCOUNTER — HOSPITAL ENCOUNTER (OUTPATIENT)
Dept: GENERAL RADIOLOGY | Facility: HOSPITAL | Age: 43
Discharge: HOME | End: 2023-04-25
Attending: NURSE PRACTITIONER
Payer: COMMERCIAL

## 2023-04-25 ENCOUNTER — OFFICE VISIT (OUTPATIENT)
Dept: INTERNAL MEDICINE CLINIC | Facility: CLINIC | Age: 43
End: 2023-04-25

## 2023-04-25 VITALS
WEIGHT: 185 LBS | DIASTOLIC BLOOD PRESSURE: 60 MMHG | SYSTOLIC BLOOD PRESSURE: 138 MMHG | BODY MASS INDEX: 24.52 KG/M2 | HEIGHT: 73 IN | HEART RATE: 63 BPM

## 2023-04-25 DIAGNOSIS — M54.50 ACUTE LEFT-SIDED LOW BACK PAIN WITHOUT SCIATICA: ICD-10-CM

## 2023-04-25 DIAGNOSIS — S39.012A STRAIN OF LUMBAR REGION, INITIAL ENCOUNTER: ICD-10-CM

## 2023-04-25 DIAGNOSIS — M54.50 ACUTE LEFT-SIDED LOW BACK PAIN WITHOUT SCIATICA: Primary | ICD-10-CM

## 2023-04-25 PROCEDURE — 72110 X-RAY EXAM L-2 SPINE 4/>VWS: CPT | Performed by: NURSE PRACTITIONER

## 2023-04-25 PROCEDURE — 99213 OFFICE O/P EST LOW 20 MIN: CPT | Performed by: NURSE PRACTITIONER

## 2023-04-25 PROCEDURE — 3078F DIAST BP <80 MM HG: CPT | Performed by: NURSE PRACTITIONER

## 2023-04-25 PROCEDURE — 3075F SYST BP GE 130 - 139MM HG: CPT | Performed by: NURSE PRACTITIONER

## 2023-04-25 PROCEDURE — 3008F BODY MASS INDEX DOCD: CPT | Performed by: NURSE PRACTITIONER

## 2023-04-25 RX ORDER — TIZANIDINE 2 MG/1
TABLET ORAL
Qty: 40 TABLET | Refills: 0 | Status: SHIPPED | OUTPATIENT
Start: 2023-04-25

## 2023-04-25 RX ORDER — PREDNISONE 10 MG/1
TABLET ORAL
Qty: 35 TABLET | Refills: 0 | Status: SHIPPED | OUTPATIENT
Start: 2023-04-25

## 2023-04-25 NOTE — PATIENT INSTRUCTIONS
Ice areas of discomfort 15 to 20  minutes four times per day. Plan  1) Ice back for 15 to 20 minutes four times per day. 2) Rest  3) Tizanidine 2 mg to be taken at night. Do not drive while taking this medications. 4) Prednisone medium taper. 5) Rest - for two weeks  6) Xray of your spine  7) Consider tilt table in the future    If symptoms do not improve please give the office a call.

## 2023-04-27 ENCOUNTER — OFFICE VISIT (OUTPATIENT)
Dept: PHYSICAL MEDICINE AND REHAB | Facility: CLINIC | Age: 43
End: 2023-04-27
Payer: COMMERCIAL

## 2023-04-27 VITALS — BODY MASS INDEX: 24.52 KG/M2 | WEIGHT: 185 LBS | HEART RATE: 39 BPM | HEIGHT: 73 IN | OXYGEN SATURATION: 98 %

## 2023-04-27 DIAGNOSIS — S39.012A ACUTE MYOFASCIAL STRAIN OF LUMBAR REGION, INITIAL ENCOUNTER: Primary | ICD-10-CM

## 2023-04-27 PROCEDURE — 99244 OFF/OP CNSLTJ NEW/EST MOD 40: CPT | Performed by: PHYSICAL MEDICINE & REHABILITATION

## 2023-04-27 PROCEDURE — 3008F BODY MASS INDEX DOCD: CPT | Performed by: PHYSICAL MEDICINE & REHABILITATION

## 2023-04-27 RX ORDER — CICLOPIROX 1 G/100ML
SHAMPOO TOPICAL
COMMUNITY
Start: 2023-02-28

## 2023-04-27 NOTE — PATIENT INSTRUCTIONS
-Start physical therapy and home exercises  -Finish prednisone  -Ice/Heat as tolerated  -Please stop the medication if you have any side effects and call the office if you have any questions or concerns  -If no better will consider MRI for further evaluation  -Follow up in 4 weeks

## 2023-05-02 ENCOUNTER — TELEPHONE (OUTPATIENT)
Dept: PHYSICAL MEDICINE AND REHAB | Facility: CLINIC | Age: 43
End: 2023-05-02

## 2023-05-08 ENCOUNTER — MED REC SCAN ONLY (OUTPATIENT)
Dept: PHYSICAL MEDICINE AND REHAB | Facility: CLINIC | Age: 43
End: 2023-05-08

## 2023-11-02 ENCOUNTER — LAB ENCOUNTER (OUTPATIENT)
Dept: LAB | Facility: HOSPITAL | Age: 43
End: 2023-11-02
Attending: Other
Payer: COMMERCIAL

## 2023-11-02 DIAGNOSIS — F31.72 BIPOLAR DISORDER, IN FULL REMISSION, MOST RECENT EPISODE HYPOMANIC (HCC): ICD-10-CM

## 2023-11-02 DIAGNOSIS — Z51.81 ENCOUNTER FOR THERAPEUTIC DRUG MONITORING: Primary | ICD-10-CM

## 2023-11-02 LAB
ANION GAP SERPL CALC-SCNC: 6 MMOL/L (ref 0–18)
BUN BLD-MCNC: 13 MG/DL (ref 9–23)
BUN/CREAT SERPL: 14.9 (ref 10–20)
CALCIUM BLD-MCNC: 9.7 MG/DL (ref 8.7–10.4)
CHLORIDE SERPL-SCNC: 102 MMOL/L (ref 98–112)
CO2 SERPL-SCNC: 29 MMOL/L (ref 21–32)
CREAT BLD-MCNC: 0.87 MG/DL
EGFRCR SERPLBLD CKD-EPI 2021: 111 ML/MIN/1.73M2 (ref 60–?)
FASTING STATUS PATIENT QL REPORTED: YES
GLUCOSE BLD-MCNC: 78 MG/DL (ref 70–99)
OSMOLALITY SERPL CALC.SUM OF ELEC: 283 MOSM/KG (ref 275–295)
POTASSIUM SERPL-SCNC: 4 MMOL/L (ref 3.5–5.1)
SODIUM SERPL-SCNC: 137 MMOL/L (ref 136–145)
TSI SER-ACNC: 2.81 MIU/ML (ref 0.55–4.78)

## 2023-11-02 PROCEDURE — 36415 COLL VENOUS BLD VENIPUNCTURE: CPT

## 2023-11-02 PROCEDURE — 84443 ASSAY THYROID STIM HORMONE: CPT

## 2023-11-02 PROCEDURE — 80048 BASIC METABOLIC PNL TOTAL CA: CPT

## 2023-11-02 PROCEDURE — 80178 ASSAY OF LITHIUM: CPT

## 2023-11-03 LAB — LITHIUM SERPL-SCNC: 0.4 MMOL/L (ref 0.6–1.2)

## 2024-07-12 ENCOUNTER — LAB ENCOUNTER (OUTPATIENT)
Dept: LAB | Facility: HOSPITAL | Age: 44
End: 2024-07-12
Attending: Other
Payer: COMMERCIAL

## 2024-07-12 DIAGNOSIS — Z51.81 ENCOUNTER FOR THERAPEUTIC DRUG MONITORING: Primary | ICD-10-CM

## 2024-07-12 DIAGNOSIS — F31.72 BIPOLAR DISORDER, IN FULL REMISSION, MOST RECENT EPISODE HYPOMANIC (HCC): ICD-10-CM

## 2024-07-12 LAB
ANION GAP SERPL CALC-SCNC: 6 MMOL/L (ref 0–18)
BUN BLD-MCNC: 17 MG/DL (ref 9–23)
BUN/CREAT SERPL: 16.5 (ref 10–20)
CALCIUM BLD-MCNC: 9.9 MG/DL (ref 8.7–10.4)
CHLORIDE SERPL-SCNC: 109 MMOL/L (ref 98–112)
CO2 SERPL-SCNC: 25 MMOL/L (ref 21–32)
CREAT BLD-MCNC: 1.03 MG/DL
EGFRCR SERPLBLD CKD-EPI 2021: 94 ML/MIN/1.73M2 (ref 60–?)
FASTING STATUS PATIENT QL REPORTED: YES
GLUCOSE BLD-MCNC: 71 MG/DL (ref 70–99)
LITHIUM SERPL-SCNC: 0.5 MMOL/L (ref 0.6–1.2)
OSMOLALITY SERPL CALC.SUM OF ELEC: 290 MOSM/KG (ref 275–295)
POTASSIUM SERPL-SCNC: 4.6 MMOL/L (ref 3.5–5.1)
SODIUM SERPL-SCNC: 140 MMOL/L (ref 136–145)
TSI SER-ACNC: 6.27 MIU/ML (ref 0.55–4.78)

## 2024-07-12 PROCEDURE — 84443 ASSAY THYROID STIM HORMONE: CPT

## 2024-07-12 PROCEDURE — 36415 COLL VENOUS BLD VENIPUNCTURE: CPT

## 2024-07-12 PROCEDURE — 80048 BASIC METABOLIC PNL TOTAL CA: CPT

## 2024-07-12 PROCEDURE — 80178 ASSAY OF LITHIUM: CPT

## 2024-10-04 ENCOUNTER — LAB ENCOUNTER (OUTPATIENT)
Dept: LAB | Facility: HOSPITAL | Age: 44
End: 2024-10-04
Attending: Other
Payer: COMMERCIAL

## 2024-10-04 DIAGNOSIS — R94.6 ABNORMAL THYROID FUNCTION TEST: Primary | ICD-10-CM

## 2024-10-04 LAB
T3FREE SERPL-MCNC: 3.43 PG/ML (ref 2.4–4.2)
TSI SER-ACNC: 4.68 MIU/ML (ref 0.55–4.78)

## 2024-10-04 PROCEDURE — 84443 ASSAY THYROID STIM HORMONE: CPT

## 2024-10-04 PROCEDURE — 36415 COLL VENOUS BLD VENIPUNCTURE: CPT

## 2024-10-04 PROCEDURE — 84481 FREE ASSAY (FT-3): CPT

## 2024-10-29 ENCOUNTER — TELEMEDICINE (OUTPATIENT)
Dept: PHYSICAL MEDICINE AND REHAB | Facility: CLINIC | Age: 44
End: 2024-10-29
Payer: COMMERCIAL

## 2024-10-29 ENCOUNTER — HOSPITAL ENCOUNTER (OUTPATIENT)
Dept: GENERAL RADIOLOGY | Facility: HOSPITAL | Age: 44
Discharge: HOME | End: 2024-10-29
Attending: PHYSICAL MEDICINE & REHABILITATION
Payer: COMMERCIAL

## 2024-10-29 DIAGNOSIS — M79.671 ACUTE FOOT PAIN, RIGHT: Primary | ICD-10-CM

## 2024-10-29 DIAGNOSIS — M79.671 ACUTE FOOT PAIN, RIGHT: ICD-10-CM

## 2024-10-29 PROCEDURE — 99214 OFFICE O/P EST MOD 30 MIN: CPT | Performed by: PHYSICAL MEDICINE & REHABILITATION

## 2024-10-29 PROCEDURE — 73630 X-RAY EXAM OF FOOT: CPT | Performed by: PHYSICAL MEDICINE & REHABILITATION

## 2024-10-29 NOTE — PROGRESS NOTES
[facility]    Telemedicine Visit - Follow Up Evaluation    Telehealth Verbal Consent   I conducted a telehealth visit with Denzel Pool today, 10/29/24, which was completed using two-way, real-time interactive audio and video communication. This has been done in good sabrina to provide continuity of care in the best interest of the provider-patient relationship, due to the COVID - public health crisis/national emergency where restrictions of face-to-face office visits are ongoing. Every conscious effort was taken to allow for sufficient and adequate time to complete the visit.  The patient was made aware of the limitations of the telehealth visit, including treatment limitations as no physical exam could be performed.  The patient was advised to call 911 or to go to the ER in case there was an emergency.  The patient was also advised of the potential privacy & security concerns related to the telehealth platform.   The patient was made aware of where to find [facility]'s notice of privacy practices, telehealth consent form and other related consent forms and documents.  which are located on the [facility] website. The patient verbally agreed to telehealth consent form, related consents and the risks discussed.    Lastly, the patient confirmed that they were in [place].   Included in this visit, time may have been spent reviewing labs, medications, radiology tests and decision making. Appropriate medical decision-making and tests are ordered as detailed in the plan of care above.  Coding/billing information is submitted for this visit based on complexity of care and/or time spent for the visit.      HISTORY OF PRESENT ILLNESS:     Patient is following up right foot pain.  He is ran the marathon recently and then decided to run another marathon 2 weeks later.  He was trying to qualify for the [place] marathon.  He states during the first marathon he had no problems at all  however 2 weeks later when he tried running this past weekend another marathon, custodial through he started feeling sharp pain.  He had to walk the rest of it.  Pain is along the lateral aspect and dorsum of the foot and the lateral metatarsals.  He denies any numbness but has noted swelling in the area.  He denies any bruising.  Pain is there even at rest but more so with weightbearing and activity and even with walking.      IMAGING:   None    All imaging results were reviewed and discussed with patient.      ASSESSMENT:     1. Acute foot pain, right          PLAN:   Denzel Pool is a 44 year old male following up for right foot pain along the lateral metatarsals.  I recommend x-ray imaging to rule out a stress fracture but I am concerned about a stress reaction.  He may also have a ganglion cyst. recommend that he follow-up in the office for detailed exam.  Recommend a tall boot and icing as tolerated until he follows up.  He may benefit from MRI imaging for further evaluation.    Follow-up:   In office     We discussed that a telemedicine visit is in place of an office visit; however, this limits the ability to perform a thorough physical examination which may affect objective findings related to a specific condition and can affect treatment.    The patient verbalized understanding with this plan and was in agreement.  There are no barriers to learning.  All questions were answered.  Please note Dragon dictation software was used to dictate this note which may result in inadvertent typos.    Christoph Bear D.O. FAAPMR & CAQSM  Physical Medicine and Rehabilitation/Sports Medicine    PAST MEDICAL HISTORY:     Past Medical History:    Anxiety    Lipid screening    Prostatitis    pwe NG: \"surgery ?TURP?\"         PAST SURGICAL HISTORY:     Past Surgical History:   Procedure Laterality Date    Electrocardiogram, complete  06/29/2014    scanned to media tab         CURRENT MEDICATIONS:     Current Outpatient  Medications   Medication Sig Dispense Refill    Ciclopirox 1 % External Shampoo SHAMPOO SCALP AND BEARD EVERY OTHER DAY AS DIRECTED      predniSONE 10 MG Oral Tab 3 tabs daily for 3 days, then 2 tabs daily for 3 days, then 1.5 tabs daily for 3 days, then 1 tab daily for 3 days. 35 tablet 0    tiZANidine 2 MG Oral Tab 1-2 tabs at bedtime as needed for pain. 40 tablet 0    benzonatate 100 MG Oral Cap Take 1 capsule (100 mg total) by mouth 3 (three) times daily as needed for cough. (Patient not taking: Reported on 4/23/2023) 30 capsule 0    MONTELUKAST 10 MG Oral Tab TAKE 1 TABLET(10 MG) BY MOUTH DAILY (Patient not taking: Reported on 4/23/2023) 90 tablet 0    FLUTICASONE PROPIONATE 50 MCG/ACT Nasal Suspension SHAKE LIQUID AND USE 2 SPRAYS IN EACH NOSTRIL DAILY (Patient not taking: Reported on 4/23/2023) 48 g 0    QUEtiapine Fumarate ER 50 MG Oral Tablet 24 Hr Take 1 tablet (50 mg total) by mouth nightly. (Patient not taking: Reported on 4/23/2023) 30 tablet 0    escitalopram 10 MG Oral Tab Take 10 mg by mouth nightly. (Patient not taking: Reported on 4/23/2023)      Lithium Carbonate 600 MG Oral Cap Take 1 capsule (600 mg total) by mouth daily.           ALLERGIES:   Allergies[1]      FAMILY HISTORY:     Family History   Problem Relation Age of Onset    Diabetes Father     Heart Attack Father     Diabetes Mother     Depression Mother           SOCIAL HISTORY:     Social History     Socioeconomic History    Marital status:    Tobacco Use    Smoking status: Never    Smokeless tobacco: Never   Vaping Use    Vaping status: Never Used   Substance and Sexual Activity    Alcohol use: No     Alcohol/week: 0.0 standard drinks of alcohol    Drug use: No    Sexual activity: Not Currently     Partners: Female   Other Topics Concern    Caffeine Concern Yes     Comment: tea          REVIEW OF SYSTEMS:   As noted in HPI      PHYSICAL EXAM:   General: No immediate distress  Head: Normocephalic/ Atraumatic  Eyes: Extra-occular  movements intact  Ears/Nose/Throat:  External appearance identifies normal appearance without obvious deformity  Cardiovascular: No cyanosis, clubbing or edema  Respiratory: Non-labored respirations  Skin: No lesions noted   Neurological: alert & oriented x 3, attentive, able to follow commands, comprehention intact, spontaneous speech intact  Psychiatric: Mood and affect appropriate  Musculoskeletal Exam:  No change since last exam        LABS:   No results found for: \"EAG\", \"A1C\"  Lab Results   Component Value Date    WBC 7.1 04/26/2021    RBC 5.06 04/26/2021    HGB 14.4 04/26/2021    HCT 42.7 04/26/2021    MCV 84.4 04/26/2021    MCH 28.5 04/26/2021    MCHC 33.7 04/26/2021    RDW 11.9 04/26/2021    .0 04/26/2021    MPV 9.2 03/23/2017     Lab Results   Component Value Date    GLU 71 07/12/2024    BUN 17 07/12/2024    BUNCREA 16.5 07/12/2024    CREATSERUM 1.03 07/12/2024    ANIONGAP 6 07/12/2024    GFRNAA 91 04/26/2021    GFRAA 105 04/26/2021    CA 9.9 07/12/2024    OSMOCALC 290 07/12/2024    ALKPHO 92 12/09/2020    AST 12 (L) 12/09/2020    ALT 22 12/09/2020    ALKPHOS 97 03/07/2012    BILT 0.6 12/09/2020    TP 7.9 12/09/2020    ALB 4.2 12/09/2020    GLOBULIN 3.7 12/09/2020    AGRATIO 1.4 03/07/2012     07/12/2024    K 4.6 07/12/2024     07/12/2024    CO2 25.0 07/12/2024     No results found for: \"PTP\", \"PT\", \"INR\"  Lab Results   Component Value Date    VITD 61.6 04/22/2021                [1] No Known Allergies

## 2024-10-30 ENCOUNTER — PATIENT MESSAGE (OUTPATIENT)
Dept: PHYSICAL MEDICINE AND REHAB | Facility: CLINIC | Age: 44
End: 2024-10-30

## 2024-11-07 ENCOUNTER — OFFICE VISIT (OUTPATIENT)
Dept: PHYSICAL MEDICINE AND REHAB | Facility: CLINIC | Age: 44
End: 2024-11-07
Payer: COMMERCIAL

## 2024-11-07 DIAGNOSIS — M76.71 PERONEAL TENDONITIS, RIGHT: ICD-10-CM

## 2024-11-07 DIAGNOSIS — M79.671 ACUTE FOOT PAIN, RIGHT: Primary | ICD-10-CM

## 2024-11-07 PROCEDURE — 99214 OFFICE O/P EST MOD 30 MIN: CPT | Performed by: PHYSICAL MEDICINE & REHABILITATION

## 2024-11-07 NOTE — PROGRESS NOTES
[facility]  FOLLOW UP EVALUATION    Chief Complaint: back pain.    HISTORY OF PRESENT ILLNESS:     Chief Complaint   Patient presents with    Foot Pain     LOV 4/27/23-hx of myofascial pain. Patient here today for Right Foot Pain- patient had video appt w/ Dr. Bear- used Cam Boot & iced multiple x / day and pain is diminishing. Patient wants to figure out what the problem was-and preventative measures as wants to keep running long distances. Current pain 0/10 -no pain since weekend and that was 2-3/10       The patient is a 44 year old male with significant past medical history of anxiety, prostatitis who presents for evaluation of right ankle foot pain.  He states over the last week he wore a boot and has since discontinued and has noted good improvement overall.  He denies any pain at this point able to ambulate and do stairs without any discomfort.  He has not tried running yet.  He is not take any medication or icing at this point.  He had x-ray imaging of the ankle foot which was negative for any acute abnormality.      PAST MEDICAL HISTORY:     Past Medical History:    Anxiety    Lipid screening    Prostatitis    pwe NG: \"surgery ?TURP?\"         PAST SURGICAL HISTORY:     Past Surgical History:   Procedure Laterality Date    Electrocardiogram, complete  06/29/2014    scanned to media tab         CURRENT MEDICATIONS:     Current Outpatient Medications   Medication Sig Dispense Refill    Ciclopirox 1 % External Shampoo SHAMPOO SCALP AND BEARD EVERY OTHER DAY AS DIRECTED      predniSONE 10 MG Oral Tab 3 tabs daily for 3 days, then 2 tabs daily for 3 days, then 1.5 tabs daily for 3 days, then 1 tab daily for 3 days. 35 tablet 0    tiZANidine 2 MG Oral Tab 1-2 tabs at bedtime as needed for pain. 40 tablet 0    benzonatate 100 MG Oral Cap Take 1 capsule (100 mg total) by mouth 3 (three) times daily as needed for cough. (Patient not taking: Reported on 4/23/2023) 30  capsule 0    MONTELUKAST 10 MG Oral Tab TAKE 1 TABLET(10 MG) BY MOUTH DAILY (Patient not taking: Reported on 4/23/2023) 90 tablet 0    FLUTICASONE PROPIONATE 50 MCG/ACT Nasal Suspension SHAKE LIQUID AND USE 2 SPRAYS IN EACH NOSTRIL DAILY (Patient not taking: Reported on 4/23/2023) 48 g 0    QUEtiapine Fumarate ER 50 MG Oral Tablet 24 Hr Take 1 tablet (50 mg total) by mouth nightly. (Patient not taking: Reported on 4/23/2023) 30 tablet 0    escitalopram 10 MG Oral Tab Take 10 mg by mouth nightly. (Patient not taking: Reported on 4/23/2023)      Lithium Carbonate 600 MG Oral Cap Take 1 capsule (600 mg total) by mouth daily.           ALLERGIES:   No Known Allergies      FAMILY HISTORY:     Family History   Problem Relation Age of Onset    Diabetes Father     Heart Attack Father     Diabetes Mother     Depression Mother           SOCIAL HISTORY:     Social History     Socioeconomic History    Marital status:    Tobacco Use    Smoking status: Never    Smokeless tobacco: Never   Vaping Use    Vaping status: Never Used   Substance and Sexual Activity    Alcohol use: No     Alcohol/week: 0.0 standard drinks of alcohol    Drug use: No    Sexual activity: Not Currently     Partners: Female   Other Topics Concern    Caffeine Concern Yes     Comment: tea          REVIEW OF SYSTEMS:   Patient-reported ROS  Constitutional  Sleep Disturbance: admits  Chills: denies  Fever: denies  Weight Gain: denies  Weight Loss: denies   Cardiovascular  Chest Pain: denies  Irregular Heartbeat: denies   Respiratory  Painful Breathing: denies  Wheezing: denies   Gastrointestinal  Bowel Incontinence: denies  Heartburn: denies  Abdominal Pain: denies  Blood in Stool : denies  Rectal Pain: denies   Hematology  Easy Bruising: denies  Easy Bleeding: denies   Genitourinary  Difficulty Urinating: denies  Bladder Incontinence: denies  Pelvic Pain: denies  Painful Urination: denies   Musculoskeletal  Joint Stiffness: admits  Painful Joints:  admits  Tailbone Pain: denies  Swollen Joints: denies   Peripheral Vascular  Swelling of Legs/Feet: denies  Cold Extremities: denies   Skin  Open Sores: denies  Nodules or Lumps: denies  Rash: denies   Neurological  Loss of Strength Since last Visit: admits  Tingling/Numbness: denies  Balance: admits   Psychiatric  Anxiety: denies  Depressed Mood: denies       PHYSICAL EXAM:   There were no vitals taken for this visit.  General: No immediate distress  Head: Normocephalic/ Atraumatic  Eyes: Extra-occular movements intact.   Ears: No auricular hematoma or deformities  Mouth: No lesions or ulcerations  Heart: peripheral pulses intact. Normal capillary refill.   Lungs: Non-labored respirations  Abdomen: No abdominal guarding  Extremities: No lower extremity edema bilaterally   Skin: No lesions noted   Cognition: alert & oriented x 3, attentive, able to follow 2 step commands, comprehention intact, spontaneous speech intact  Psychiatric: Mood and affect appropriate    Gait Normal      Musculoskeletal/Neurological Exam:    Full acitve range of motion of the ankle.  Strength is 5 out of 5.  Sensation intact to light touch.  Mild tenderness to palpation of the peroneal tendon laterally.  Nontender to palpation of the ankle ligaments.  Negative anterior drawer talar tilt.      LABS:   No results found for: \"EAG\", \"A1C\"  Lab Results   Component Value Date    WBC 7.1 04/26/2021    RBC 5.06 04/26/2021    HGB 14.4 04/26/2021    HCT 42.7 04/26/2021    MCV 84.4 04/26/2021    MCH 28.5 04/26/2021    MCHC 33.7 04/26/2021    RDW 11.9 04/26/2021    .0 04/26/2021    MPV 9.2 03/23/2017     Lab Results   Component Value Date    GLU 71 07/12/2024    BUN 17 07/12/2024    BUNCREA 16.5 07/12/2024    CREATSERUM 1.03 07/12/2024    ANIONGAP 6 07/12/2024    GFRNAA 91 04/26/2021    GFRAA 105 04/26/2021    CA 9.9 07/12/2024    OSMOCALC 290 07/12/2024    ALKPHO 92 12/09/2020    AST 12 (L) 12/09/2020    ALT 22 12/09/2020    ALKPHOS 97  03/07/2012    BILT 0.6 12/09/2020    TP 7.9 12/09/2020    ALB 4.2 12/09/2020    GLOBULIN 3.7 12/09/2020    AGRATIO 1.4 03/07/2012     07/12/2024    K 4.6 07/12/2024     07/12/2024    CO2 25.0 07/12/2024     No results found for: \"PTP\", \"PT\", \"INR\"  Lab Results   Component Value Date    VITD 61.6 04/22/2021       IMAGING:   X-ray right foot completed 10/29/2024 is negative for any acute abnormality.      ASSESSMENT:     1. Acute foot pain, right    2. Peroneal tendonitis, right        Denzel Pool is a pleasant 44-year-old male presenting today for follow-up evaluation of right ankle foot pain after running injury.  He had x-ray imaging which was negative.  He is nontender to palpation at the metatarsals.  There is no tenderness to palpation of the ankle ligaments there is some mild tenderness at the peroneal tendon I believe he had a peroneal tendinitis.  I recommended continuing home exercises and slowly incorporating running.  If he continues to be limited we may consider PT for further improvement.        The patient verbalized understanding with the plan and was in agreement. All questions/concerns were addressed and there were no barriers to learning.     Christoph Bear D.O. FAAPMR & CAQSM  Physical Medicine and Rehabilitation/Sports Medicine

## 2024-11-07 NOTE — PATIENT INSTRUCTIONS
-Slowly progress back to running  -Can consider PT in the future  -Ice if needed  -Advil as needed

## 2025-01-20 ENCOUNTER — HOSPITAL ENCOUNTER (EMERGENCY)
Facility: HOSPITAL | Age: 45
Discharge: HOME | End: 2025-01-20
Attending: EMERGENCY MEDICINE
Payer: COMMERCIAL

## 2025-01-20 VITALS
TEMPERATURE: 98 F | BODY MASS INDEX: 24.38 KG/M2 | HEIGHT: 74 IN | DIASTOLIC BLOOD PRESSURE: 90 MMHG | RESPIRATION RATE: 20 BRPM | WEIGHT: 190 LBS | SYSTOLIC BLOOD PRESSURE: 130 MMHG | OXYGEN SATURATION: 100 % | HEART RATE: 72 BPM

## 2025-01-20 DIAGNOSIS — S39.012A STRAIN OF LUMBAR REGION, INITIAL ENCOUNTER: Primary | ICD-10-CM

## 2025-01-20 PROCEDURE — 99283 EMERGENCY DEPT VISIT LOW MDM: CPT

## 2025-01-20 PROCEDURE — 96372 THER/PROPH/DIAG INJ SC/IM: CPT

## 2025-01-20 RX ORDER — KETOROLAC TROMETHAMINE 10 MG/1
10 TABLET, FILM COATED ORAL EVERY 6 HOURS PRN
Qty: 14 TABLET | Refills: 0 | Status: SHIPPED | OUTPATIENT
Start: 2025-01-20 | End: 2025-01-27

## 2025-01-20 RX ORDER — KETOROLAC TROMETHAMINE 30 MG/ML
30 INJECTION, SOLUTION INTRAMUSCULAR; INTRAVENOUS ONCE
Status: COMPLETED | OUTPATIENT
Start: 2025-01-20 | End: 2025-01-20

## 2025-01-20 RX ORDER — DIAZEPAM 5 MG/1
5 TABLET ORAL ONCE
Status: COMPLETED | OUTPATIENT
Start: 2025-01-20 | End: 2025-01-20

## 2025-01-20 RX ORDER — DIAZEPAM 5 MG/1
5 TABLET ORAL 3 TIMES DAILY PRN
Qty: 12 TABLET | Refills: 0 | Status: SHIPPED | OUTPATIENT
Start: 2025-01-20 | End: 2025-01-27

## 2025-01-20 NOTE — ED INITIAL ASSESSMENT (HPI)
Pt present to ED with c/o x 3 days ago  heard/felt a pinch to the lower back. Pt sts PMH disc complications. Pt is standing in triage to avoid max. Pain of 8/10

## 2025-01-20 NOTE — ED PROVIDER NOTES
Patient Seen in: [facility] Emergency Department    History     Chief Complaint   Patient presents with    Back Pain       HPI    44-year-old male presents ER with complaint of low back pain.  Patient states that he has a history of low back strain in the past.  Patient states that 4 days ago he bent down to tie his shoe and felt strained his back.  Patient states he been having difficulty bending down since then.  Patient states that the pain medication for the pain.  Patient states muscle relaxers have helped him in the past.    History reviewed.   Past Medical History:    Anxiety    Lipid screening    Prostatitis    pwe NG: \"surgery ?TURP?\"       History reviewed.   Past Surgical History:   Procedure Laterality Date    Electrocardiogram, complete  06/29/2014    scanned to media tab         Medications :  Prescriptions Prior to Admission[1]     Family History   Problem Relation Age of Onset    Diabetes Father     Heart Attack Father     Diabetes Mother     Depression Mother        Smoking Status:   Social History     Socioeconomic History    Marital status:    Tobacco Use    Smoking status: Never    Smokeless tobacco: Never   Vaping Use    Vaping status: Never Used   Substance and Sexual Activity    Alcohol use: No     Alcohol/week: 0.0 standard drinks of alcohol    Drug use: No    Sexual activity: Not Currently     Partners: Female   Other Topics Concern    Caffeine Concern Yes     Comment: tea       ROS  All pertinent positives for the review of systems are mentioned in the HPI  All other organ systems are reviewed and are negative.    Constitutional and vital signs reviewed.      Social History and Family History elements reviewed from today, pertinent positives to the presenting problem noted.    Physical Exam     ED Triage Vitals [01/20/25 0617]   /90   Pulse 72   Resp 20   Temp 97.8 °F (36.6 °C)   Temp src Oral   SpO2 100 %   O2 Device None (Room air)       All measures to prevent  infection transmission during my interaction with the patient were taken. The patient was already wearing a droplet mask on my arrival to the room. Personal protective equipment including droplet mask, eye protection, and gloves were worn throughout the duration of the exam.  Handwashing was performed prior to and after the exam.  Stethoscope and any equipment used during my examination was cleaned with super sani-cloth germicidal wipes following the exam.     Physical Exam  Vitals and nursing note reviewed.   Constitutional:       Appearance: He is well-developed.   HENT:      Head: Normocephalic and atraumatic.      Right Ear: External ear normal.      Left Ear: External ear normal.      Nose: Nose normal.   Eyes:      Conjunctiva/sclera: Conjunctivae normal.      Pupils: Pupils are equal, round, and reactive to light.   Cardiovascular:      Rate and Rhythm: Normal rate and regular rhythm.      Heart sounds: Normal heart sounds.   Pulmonary:      Effort: Pulmonary effort is normal.      Breath sounds: Normal breath sounds.   Abdominal:      General: Bowel sounds are normal.      Palpations: Abdomen is soft.   Musculoskeletal:         General: Normal range of motion.      Cervical back: Normal range of motion and neck supple.   Skin:     General: Skin is warm and dry.   Neurological:      General: No focal deficit present.      Mental Status: He is alert and oriented to person, place, and time. Mental status is at baseline.      Cranial Nerves: No cranial nerve deficit.      Sensory: No sensory deficit.      Motor: No weakness.      Coordination: Coordination normal.      Deep Tendon Reflexes: Reflexes are normal and symmetric.   Psychiatric:         Behavior: Behavior normal.         Thought Content: Thought content normal.         Judgment: Judgment normal.         ED Course      Labs Reviewed - No data to display      Imaging Results Available and Reviewed while in ED: No results found.  ED Medications  Administered:   Medications   ketorolac (Toradol) 30 MG/ML injection 30 mg (30 mg Intramuscular Given 1/20/25 0752)   diazePAM (Valium) tab 5 mg (5 mg Oral Given 1/20/25 0750)         MDM     Vitals:    01/20/25 0617   BP: 130/90   Pulse: 72   Resp: 20   Temp: 97.8 °F (36.6 °C)   TempSrc: Oral   SpO2: 100%   Weight: 86.2 kg   Height: 188 cm (6' 2\")     *I personally reviewed and interpreted all ED vitals.  I also personally reviewed all labs and imaging if ordered    Pulse Ox: 100%, Room air, Normal     Monitor Interpretation:   normal sinus rhythm    Differential Diagnosis/ Diagnostic Considerations: Back spasm, muscle strain, sciatica.    Medical Record Review: I personally reviewed available prior medical records for any recent pertinent discharge summaries, testing, and procedures and reviewed those reports.    Complicating Factors: The patient already has does not have any pertinent problems on file. to contribute to the complexity of this ED evaluation.    Medical Decision Making  44-year-old male presents ER with complaint of low back strain.  Patient denies any trauma.  Patient with history of muscle spasm in the past.  Patient given Toradol and Valium in the ER states his pain resolved.  Patient could be discharged home and discharged home with Toradol and Valium for home and follow-up with his PCP.    Problems Addressed:  Strain of lumbar region, initial encounter: acute illness or injury    Risk  Prescription drug management.        Condition upon leaving the department: Stable    Disposition and Plan     Clinical Impression:  1. Strain of lumbar region, initial encounter        Disposition:  Discharge    Follow-up:  Sree Celestin MD  [address] [place] [id number]  [phone]    Schedule an appointment as soon as possible for a visit  If symptoms worsen      Medications Prescribed:  Current Discharge Medication List        START taking these medications    Details   Ketorolac Tromethamine  10 MG Oral Tab Take 1 tablet (10 mg total) by mouth every 6 (six) hours as needed.  Qty: 14 tablet, Refills: 0      diazePAM 5 MG Oral Tab Take 1 tablet (5 mg total) by mouth 3 (three) times daily as needed.  Qty: 12 tablet, Refills: 0    Associated Diagnoses: Strain of lumbar region, initial encounter                    [1] (Not in a hospital admission)

## 2025-07-25 ENCOUNTER — LAB ENCOUNTER (OUTPATIENT)
Dept: LAB | Facility: HOSPITAL | Age: 45
End: 2025-07-25
Attending: Other
Payer: COMMERCIAL

## 2025-07-25 DIAGNOSIS — Z51.81 THERAPEUTIC DRUG MONITORING: Primary | ICD-10-CM

## 2025-07-25 LAB
ALBUMIN SERPL-MCNC: 4.7 G/DL (ref 3.2–4.8)
ALBUMIN/GLOB SERPL: 2 (ref 1–2)
ALP LIVER SERPL-CCNC: 95 U/L (ref 45–117)
ALT SERPL-CCNC: 17 U/L (ref 10–49)
ANION GAP SERPL CALC-SCNC: 6 MMOL/L (ref 0–18)
AST SERPL-CCNC: 22 U/L (ref ?–34)
BASOPHILS # BLD AUTO: 0.03 X10(3) UL (ref 0–0.2)
BASOPHILS NFR BLD AUTO: 0.5 %
BILIRUB SERPL-MCNC: 0.6 MG/DL (ref 0.3–1.2)
BUN BLD-MCNC: 14 MG/DL (ref 9–23)
BUN/CREAT SERPL: 14.3 (ref 10–20)
CALCIUM BLD-MCNC: 9.5 MG/DL (ref 8.7–10.4)
CHLORIDE SERPL-SCNC: 105 MMOL/L (ref 98–112)
CO2 SERPL-SCNC: 27 MMOL/L (ref 21–32)
CREAT BLD-MCNC: 0.98 MG/DL (ref 0.7–1.3)
DEPRECATED RDW RBC AUTO: 38.2 FL (ref 35.1–46.3)
EGFRCR SERPLBLD CKD-EPI 2021: 99 ML/MIN/1.73M2 (ref 60–?)
EOSINOPHIL # BLD AUTO: 0.19 X10(3) UL (ref 0–0.7)
EOSINOPHIL NFR BLD AUTO: 2.9 %
ERYTHROCYTE [DISTWIDTH] IN BLOOD BY AUTOMATED COUNT: 12.6 % (ref 11–15)
FASTING STATUS PATIENT QL REPORTED: YES
GLOBULIN PLAS-MCNC: 2.4 G/DL (ref 2–3.5)
GLUCOSE BLD-MCNC: 91 MG/DL (ref 70–99)
HCT VFR BLD AUTO: 42.4 % (ref 39–53)
HGB BLD-MCNC: 14.1 G/DL (ref 13–17.5)
IMM GRANULOCYTES # BLD AUTO: 0.02 X10(3) UL (ref 0–1)
IMM GRANULOCYTES NFR BLD: 0.3 %
LITHIUM SERPL-SCNC: 0.6 MMOL/L (ref 0.6–1.2)
LYMPHOCYTES # BLD AUTO: 1.89 X10(3) UL (ref 1–4)
LYMPHOCYTES NFR BLD AUTO: 29.3 %
MCH RBC QN AUTO: 27.8 PG (ref 26–34)
MCHC RBC AUTO-ENTMCNC: 33.3 G/DL (ref 31–37)
MCV RBC AUTO: 83.6 FL (ref 80–100)
MONOCYTES # BLD AUTO: 0.38 X10(3) UL (ref 0.1–1)
MONOCYTES NFR BLD AUTO: 5.9 %
NEUTROPHILS # BLD AUTO: 3.95 X10 (3) UL (ref 1.5–7.7)
NEUTROPHILS # BLD AUTO: 3.95 X10(3) UL (ref 1.5–7.7)
NEUTROPHILS NFR BLD AUTO: 61.1 %
OSMOLALITY SERPL CALC.SUM OF ELEC: 286 MOSM/KG (ref 275–295)
PLATELET # BLD AUTO: 195 10(3)UL (ref 150–450)
POTASSIUM SERPL-SCNC: 4.4 MMOL/L (ref 3.5–5.1)
PROT SERPL-MCNC: 7.1 G/DL (ref 5.7–8.2)
RBC # BLD AUTO: 5.07 X10(6)UL (ref 4.3–5.7)
SODIUM SERPL-SCNC: 138 MMOL/L (ref 136–145)
TSI SER-ACNC: 4.35 UIU/ML (ref 0.55–4.78)
WBC # BLD AUTO: 6.5 X10(3) UL (ref 4–11)

## 2025-07-25 PROCEDURE — 36415 COLL VENOUS BLD VENIPUNCTURE: CPT

## 2025-07-25 PROCEDURE — 84443 ASSAY THYROID STIM HORMONE: CPT

## 2025-07-25 PROCEDURE — 85025 COMPLETE CBC W/AUTO DIFF WBC: CPT

## 2025-07-25 PROCEDURE — 80053 COMPREHEN METABOLIC PANEL: CPT

## 2025-07-25 PROCEDURE — 80178 ASSAY OF LITHIUM: CPT

## (undated) NOTE — MR AVS SNAPSHOT
[address]  [address] [facility]  [phone]               Thank you for choosing us for your health care visit with Vane Alonzo MD.  We are glad to serve you and happy to provide you with this summary symptoms. 2. Pt will be able to tolerate sitting with proper lumbar support > 1 hour without back pain/tightness. 3. Pt will be able to roll in bed without LBP or discomfort.          MyChart     Visit [employer]  You can access your MyChart to more active

## (undated) NOTE — MR AVS SNAPSHOT
[facility]  [address] [place] [address], [address] [place] [address]               Thank you for choosing us for your health care visit with Margaux Vasquez MD.  We are glad to serve you and happy to provide you with this summary of 2. Pt will be able to tolerate sitting with proper lumbar support > 1 hour without back pain/tightness. 3. Pt will be able to roll in bed without LBP or discomfort.          MyChart     Visit [employer]  You can access your MyChart to more actively manage yo

## (undated) NOTE — LETTER
4/22/2021              [facility] [address]        [address] [address]         To whom it may concern,    Migue Lubin is under our care and will need to be off of work from 4/21/2021-5/24/2021.       Sincerely,

## (undated) NOTE — MR AVS SNAPSHOT
[facility] SPECIALTY [facility] [place] [address] [place]  [address]  [phone]               Thank you for choosing us for your health care visit with Sandra Shane MD.  We are glad to serve you and happy to provide you with this summary o You can access your MyChart to more actively manage your health care and view more details from this visit by going to [email or web address].   If you've recently had a stay at the Hospital you can access your discharge instructions in [address] by aliyah

## (undated) NOTE — MR AVS SNAPSHOT
[address] [facility]  [facility] [address]  [phone]               Thank you for choosing us for your health care visit with Tylor Carter PT.   We are glad to serve you and happy to provide you with t Feb 15, 2017  5:15 PM   [facility] Visit By Therapist with Sudhakar Wilson, PTA   [address] ([facility])    [address]   [phone]           Please arrive at you

## (undated) NOTE — MR AVS SNAPSHOT
Maximiliano Garcia [address]  [facility] [address]  [phone]               Thank you for choosing us for your health care visit with Rosio Lozano PT.   We are glad to serve you and happy to provide you with t Feb 18, 2017  8:00 AM   [facility] Visit By Therapist with Shaila Rivas, [address] [facility] ([address] [place] [address])    [address] [address]   [phone]           Please arrive at your

## (undated) NOTE — MR AVS SNAPSHOT
[address] [facility] [address]  [phone]               Thank you for choosing us for your health care visit with Lucho Lozano MD.  We are glad to serve you and happy to provide you with this summary of your visit. - Nolan [address], [phone], [phone]  [address], [facility] [address]    Hours:  24-hours Phone:  [phone]    - Cyclobenzaprine HCl 10 MG Tabs  - methylPREDNISolone 4 MG Tbpk      You can get t are inactive.      HOW TO GET STARTED: HOW TO STAY MOTIVATED:   Start activities slowly and build up over time Do what you like   Get your heart pumping – brisk walking, biking, swimming Even 10 minute increments are effective and add up over the week   2 ½

## (undated) NOTE — MR AVS SNAPSHOT
Maximiliano Garcia [address]  [phone]  [phone]               Thank you for choosing us for your health care visit with Risa Payton PTA.   We are glad to serve you and happy to provide you with Feb 22, 2017  5:15 PM   [facility] Visit By Therapist with Sandhya Escudero, PTA   [address] [facility] ([address] [place] [address])    [address]   [phone]           Please arrive at you